# Patient Record
Sex: FEMALE | Race: WHITE | Employment: OTHER | URBAN - METROPOLITAN AREA
[De-identification: names, ages, dates, MRNs, and addresses within clinical notes are randomized per-mention and may not be internally consistent; named-entity substitution may affect disease eponyms.]

---

## 2021-01-01 ENCOUNTER — HOSPITAL ENCOUNTER (EMERGENCY)
Age: 66
End: 2021-11-11
Attending: EMERGENCY MEDICINE
Payer: MEDICARE

## 2021-01-01 VITALS — OXYGEN SATURATION: 80 % | HEART RATE: 96 BPM | SYSTOLIC BLOOD PRESSURE: 85 MMHG | DIASTOLIC BLOOD PRESSURE: 43 MMHG

## 2021-01-01 DIAGNOSIS — I46.9 CARDIAC ARREST (HCC): Primary | ICD-10-CM

## 2021-01-01 LAB
GFR AFRICAN AMERICAN: 60
GFR NON-AFRICAN AMERICAN: 50 ML/MIN/1.73
GLUCOSE BLD-MCNC: 169 MG/DL (ref 74–99)
METER GLUCOSE: 262 MG/DL (ref 74–99)
PERFORMED ON: ABNORMAL
POC CHLORIDE: >140 MMOL/L (ref 100–108)
POC CREATININE: 1.1 MG/DL (ref 0.5–1)
POC POTASSIUM: 4.4 MMOL/L (ref 3.5–5)
POC SODIUM: 151 MMOL/L (ref 132–146)

## 2021-01-01 PROCEDURE — 82435 ASSAY OF BLOOD CHLORIDE: CPT

## 2021-01-01 PROCEDURE — 6360000002 HC RX W HCPCS: Performed by: EMERGENCY MEDICINE

## 2021-01-01 PROCEDURE — 82947 ASSAY GLUCOSE BLOOD QUANT: CPT

## 2021-01-01 PROCEDURE — 84295 ASSAY OF SERUM SODIUM: CPT

## 2021-01-01 PROCEDURE — 6360000002 HC RX W HCPCS

## 2021-01-01 PROCEDURE — 82962 GLUCOSE BLOOD TEST: CPT

## 2021-01-01 PROCEDURE — 99283 EMERGENCY DEPT VISIT LOW MDM: CPT

## 2021-01-01 PROCEDURE — 84132 ASSAY OF SERUM POTASSIUM: CPT

## 2021-01-01 PROCEDURE — 82565 ASSAY OF CREATININE: CPT

## 2021-01-01 PROCEDURE — 93005 ELECTROCARDIOGRAM TRACING: CPT | Performed by: EMERGENCY MEDICINE

## 2021-01-01 PROCEDURE — 93010 ELECTROCARDIOGRAM REPORT: CPT | Performed by: INTERNAL MEDICINE

## 2021-01-01 PROCEDURE — 92950 HEART/LUNG RESUSCITATION CPR: CPT

## 2021-01-01 PROCEDURE — 31500 INSERT EMERGENCY AIRWAY: CPT

## 2021-01-01 PROCEDURE — 36556 INSERT NON-TUNNEL CV CATH: CPT

## 2021-01-01 RX ORDER — MAGNESIUM SULFATE HEPTAHYDRATE 500 MG/ML
INJECTION, SOLUTION INTRAMUSCULAR; INTRAVENOUS DAILY PRN
Status: COMPLETED | OUTPATIENT
Start: 2021-01-01 | End: 2021-01-01

## 2021-01-01 RX ORDER — EPINEPHRINE 0.1 MG/ML
SYRINGE (ML) INJECTION
Status: DISCONTINUED
Start: 2021-01-01 | End: 2021-11-12 | Stop reason: HOSPADM

## 2021-01-01 RX ADMIN — ALTEPLASE 50 MG: KIT at 18:09

## 2021-01-01 RX ADMIN — MAGNESIUM SULFATE HEPTAHYDRATE 2000 MG: 500 INJECTION, SOLUTION INTRAMUSCULAR; INTRAVENOUS at 18:05

## 2021-01-01 RX ADMIN — ALTEPLASE 50 MG: KIT at 17:48

## 2021-11-11 NOTE — ED PROVIDER NOTES
Department of Emergency Medicine   ED  Provider Note  Admit Date/RoomTime: 11/11/2021  5:39 PM  ED Room: [place]                11/11/21  6:51 PM EST      HISTORY OF PRESENT ILLNESS:  (Nurses Notes Reviewed)    Chief Complaint:   No chief complaint on file. Source of history provided by:  spouse/SO and EMS personnel. History/Exam Limitations: due to condition. Nasreen Stuart is a 78 y.o. old female presenting to the emergency department, for cardiac arrest, which occured just prior to arrival.  Per the patient's family member she called and informed them to call EMS as she is at a high school and was short of breath. When EMS arrived the patient did undergo cardiac arrest.  The patient did receive bystander CPR and did undergo defibrillation. . The patient did undergo ACLS protocol in route to the emergency department. The history is limited secondary to the acuity of the patient's condition. Onset:  sudden. Witnessed: yes. Available History:      Prior Cardiac Disease:   Unknown. Drug Use/Overdose ? Unknown. Drowning ? No.     GI Bleeding ? Unknown. Hypothermia ? No.     Other:   N/A. Prehospital Care:  ACLS protocol. Initial Rhythm: Asystole on EMS arrival.  Prehospital Treatment:       CPR:   yes. Intubation:   Ciro airway     IV Established:   no     Defibrillation:   yes     External Pacer:   no     Epinephrine:   yes     Atropine:   no     Response to Treatment:  Transient return of pulse: Yes. Sustained return of pulse:  No.  Associated Signs and Symptoms (preceding arrest):  Shortness of breath. Other History:   not currently available. PAST MEDICAL, SURGICAL, SOCIAL AND FAMILY HISTORY SECTION    Past Medical History:  has no past medical history on file. Past Surgical History:  has no past surgical history on file. Social History:      Family History: family history is not on file.      The patients home medications have been reviewed. Allergies: Patient has no allergy information on record. Review of Systems:   Pertinent positives and negatives are stated within HPI, all other systems reviewed and are negative. PHYSICAL EXAM:     Constitutional/General: unresponsive  Head: Normocephalic and atraumatic  Eyes: Fixed and dilated  ENT: Airway patent. ETT in place. Neck: Supple, full ROM, no stridor, no meningeal signs  Respiratory: No spontaneous respirations. Equal breath sounds with controlled ventilation   Cardiovascular: Heart sounds are Absent. Pulses are Absent. GI:  Abdomen Soft, Non distended. No pulsatile masses. Musculoskeletal: cool,  no edema  Integument:  pallor   Neurologic: unresponsive . Neurological exam limited due to condition              ------------------------------------------------ RESULTS ---------------------------------------------------    LABS  Results for orders placed or performed during the hospital encounter of 11/11/21   POCT Glucose   Result Value Ref Range    Meter Glucose 262 (H) 74 - 99 mg/dL   POCT Venous   Result Value Ref Range    POC Sodium 151 (H) 132 - 146 mmol/L    POC Potassium 4.4 3.5 - 5.0 mmol/L    POC Chloride >140 (HH) 100 - 108 mmol/L    POC Glucose 169 (H) 74 - 99 mg/dl    POC Creatinine 1.1 (H) 0.5 - 1.0 mg/dL    GFR Non-African American 50 >=60 mL/min/1.73    GFR  60     Performed on SEE BELOW        RADIOLOGY  No orders to display             ---------------------------- NURSING NOTES AND VITALS REVIEWED -------------------------   The nursing notes within the ED encounter and vital signs as below have been reviewed.    BP (!) 85/43   Pulse 96   SpO2 (!) 80%   Oxygen Saturation Interpretation: Abnormal      ------------------------------------------PROGRESS NOTES -------------------------------------------    ED COURSE MEDICATIONS:                Medications   magnesium sulfate injection (2,000 mg IntraVENous Given 11/11/21 1805)   alteplase (ACTIVASE) injection 50 mg (50 mg IntraVENous Given 11/11/21 1748)   alteplase (ACTIVASE) injection 50 mg (50 mg IntraVENous Given 11/11/21 1809)     PROCEDURE  11/11/21       Time: 536 PM    INTUBATION  Risks, benefits and alternatives if able (for applicable procedures below) described. Performed By: EM Attending Physician. Indication:  Cardiac arrest.   Informed consent: Consent unable to be obtained due to patient's condition. .  Procedure: Following Preoxygenation the patient was pretreated with None followed by None. Intubation was performed after single attempt(s) Using C MAC 7.5mm cuffed endotracheal tube was inserted . Initial post procedure placement:  confirmed by bilateral breath sounds, an end tidal CO2 detector, absence of sounds over the stomach, tube fogging, adequate chest rise, adequate pulse oximetry reading, improved skin color and bilateral breath sounds, ETCO2 detection, and absence of sounds over stomach. Tube Secured @ 23cm at the Lip. Post procedure chest x-ray: is pending. Procedural Complications: None. Anesthesia Consult:  No.       Central Line Placement Procedure Note    Indication: central venous monitoring    Consent: Unable to be obtained due to patient's condition. Procedure: The patient was positioned appropriately and the skin over the right femoral vein was prepped with Chloraprep. Local anesthesia was not performed due to the emergent nature of this procedure. A large bore needle was used to identify the vein. A guide wire was then inserted into the vein through the needle. A triple lumen catheter was then inserted into the vessel over the guide wire using the Seldinger technique. All ports showed good, free flowing blood return and were flushed with saline solution. The catheter was then securely fastened to the skin with suture at the hub. Two sutures were placed into the proximal eyelets.  An antibiotic disk was placed and the site was then covered with a sterile dressing. A post procedure X-ray was not indicated. The patient tolerated the procedure well. Complications: None          Medical Decision Making/ED Course:  Patient is a 25-year-old female presenting emergency department in cardiac arrest.  ACLS protocol was initiated. The patient did undergo intubation as well central line placed in the emergency department. We did have brief return of ROSC in the emergency department. The patient was initially placed on epi drip. The patient again lost pulses. Plan of care chemistry was unremarkable. Patient lost pulses multiple times in the emergency department and we did not leave the patient's room and through her entire stay as I to step out briefly to speak with family. There was suspicion initially of potential pulmonary embolus as patient did complain of shortness of breath. She did undergo TPA administration. Patient did undergo over an hour of resuscitative efforts in the emergency department. I did discuss case multiple times with the patient  as well as multiple other family members. Further efforts were deemed futile. Patient's family does understand. Time of death 5. This patient's ED course included: a personal history and physicial examination, re-evaluation prior to disposition, multiple bedside re-evaluations, IV medications, cardiac monitoring, continuous pulse oximetry and complex medical decision making and emergency management    CONSULTATIONS:             ED Course as of 11/13/21 1101   Thu Nov 11, 2021 2001 Patient's PCP is Keshav Olivas   [MT]   2012 Spoke with Dr. Alexandre Winchester she will sign the death certificate [MT]      ED Course User Index  [MT] Taurus Luna DO               CRITICAL CARE:  Please note that the withdrawal or failure to initiate urgent interventions for this patient would likely result in a life threatening deterioration or permanent disability.       Accordingly this patient received 45 minutes of critical care time, excluding separately billable procedures. Counseling:   I have spoken with the significant other regarding the patient's treatment/condition at this time. --------------------------------------- IMPRESSION & DISPOSITION --------------------------------     IMPRESSION:  1. Cardiac arrest ([facility] [address]            DISPOSITION:  Disposition: Other Disposition: . Patient condition is     ATTENDING PROVIDER ATTESTATION:     Abelino Mcdonald presented to the emergency department for evaluation of No chief complaint on file. I have reviewed and discussed the case, including pertinent history (medical, surgical, family and social) and exam findings with the Resident and the Nurse assigned to Abelino Mcdonald. I have personally performed and/or participated in the history, exam, medical decision making, and procedures and agree with all pertinent clinical information. I have reviewed my findings and recommendations with [place] and members of family present at the time of disposition. I, Dr. Quinn Amaral am the primary physician of record for this patient. MDM: The patient is 78 y.o. female  with a past medical history of No past medical history on file. presenting to the emergency department with a chief complaint of cardiac arrest.  The history is obtained from the patient's  as well as EMS. The patient did arrive in cardiac arrest.  Patient was intubated and did have central line placed in emergency department as ACLS protocol was carried out. We did also initiate TPA as the patient did complain of shortness of breath prior to cardiac arrest.  I did discuss case personally with the patient's  as well as multiple family members. Further resuscitative efforts after greater than 1 hour of resuscitation were deemed futile. Patient's family is in understanding. Time of death 5.     Critical care:  Critical Care: Please note that the withdrawal or failure to initiate urgent interventions for this patient would likely result in a life threatening deterioration or permanent disability. Accordingly this patient received 45 minutes of critical care time, excluding separately billable procedures. My findings/plan: The encounter diagnosis was Cardiac arrest [facility]). There are no discharge medications for this patient.     [address] Rocco Cordova, DO  11/13/21 1106

## 2021-11-11 NOTE — ED NOTES
Bed: 04  Expected date:   Expected time:   Means of arrival:   Comments:   For [address], [place] - [place]  11/11/21 7041

## 2021-11-11 NOTE — CODE DOCUMENTATION
Arrived with cpr in progress, found going upstairs, became sob, 1 shock given by school, systole entire time for ems, IO left shoulder, 3 epi given by ems last one approx 1732, christie airway in place

## 2021-11-11 NOTE — ED NOTES
Patient's apple watch given to [facility])      Karl Vigil RN  11/11/21 2673

## 2021-11-19 LAB
EKG ATRIAL RATE: 80 BPM
EKG Q-T INTERVAL: 592 MS
EKG QRS DURATION: 228 MS
EKG QTC CALCULATION (BAZETT): 695 MS
EKG R AXIS: -62 DEGREES
EKG T AXIS: 118 DEGREES
EKG VENTRICULAR RATE: 83 BPM